# Patient Record
Sex: FEMALE | Race: WHITE
[De-identification: names, ages, dates, MRNs, and addresses within clinical notes are randomized per-mention and may not be internally consistent; named-entity substitution may affect disease eponyms.]

---

## 2020-02-03 ENCOUNTER — HOSPITAL ENCOUNTER (EMERGENCY)
Dept: HOSPITAL 74 - JER | Age: 50
Discharge: HOME | End: 2020-02-03
Payer: COMMERCIAL

## 2020-02-03 VITALS — TEMPERATURE: 97.7 F | DIASTOLIC BLOOD PRESSURE: 70 MMHG | SYSTOLIC BLOOD PRESSURE: 148 MMHG | HEART RATE: 110 BPM

## 2020-02-03 VITALS — BODY MASS INDEX: 22.4 KG/M2

## 2020-02-03 DIAGNOSIS — F17.210: ICD-10-CM

## 2020-02-03 DIAGNOSIS — N28.1: ICD-10-CM

## 2020-02-03 DIAGNOSIS — R10.9: Primary | ICD-10-CM

## 2020-02-03 LAB
ALBUMIN SERPL-MCNC: 3.2 G/DL (ref 3.4–5)
ALP SERPL-CCNC: 119 U/L (ref 45–117)
ALT SERPL-CCNC: 51 U/L (ref 13–61)
ANION GAP SERPL CALC-SCNC: 8 MMOL/L (ref 8–16)
APPEARANCE UR: CLEAR
AST SERPL-CCNC: 47 U/L (ref 15–37)
BASOPHILS # BLD: 0.3 % (ref 0–2)
BILIRUB SERPL-MCNC: 0.4 MG/DL (ref 0.2–1)
BILIRUB UR STRIP.AUTO-MCNC: NEGATIVE MG/DL
BUN SERPL-MCNC: 15.5 MG/DL (ref 7–18)
CALCIUM SERPL-MCNC: 9 MG/DL (ref 8.5–10.1)
CHLORIDE SERPL-SCNC: 110 MMOL/L (ref 98–107)
CO2 SERPL-SCNC: 23 MMOL/L (ref 21–32)
COLOR UR: YELLOW
CREAT SERPL-MCNC: 0.5 MG/DL (ref 0.55–1.3)
DEPRECATED RDW RBC AUTO: 13.2 % (ref 11.6–15.6)
EOSINOPHIL # BLD: 0.2 % (ref 0–4.5)
GLUCOSE SERPL-MCNC: 98 MG/DL (ref 74–106)
HCT VFR BLD CALC: 43.7 % (ref 32.4–45.2)
HGB BLD-MCNC: 14.6 GM/DL (ref 10.7–15.3)
KETONES UR QL STRIP: NEGATIVE
LEUKOCYTE ESTERASE UR QL STRIP.AUTO: NEGATIVE
LIPASE SERPL-CCNC: 88 U/L (ref 73–393)
LYMPHOCYTES # BLD: 35.8 % (ref 8–40)
MCH RBC QN AUTO: 27.9 PG (ref 25.7–33.7)
MCHC RBC AUTO-ENTMCNC: 33.3 G/DL (ref 32–36)
MCV RBC: 83.7 FL (ref 80–96)
MONOCYTES # BLD AUTO: 10.6 % (ref 3.8–10.2)
NEUTROPHILS # BLD: 53.1 % (ref 42.8–82.8)
NITRITE UR QL STRIP: NEGATIVE
PH UR: 5 [PH] (ref 5–8)
PLATELET # BLD AUTO: 196 K/MM3 (ref 134–434)
PMV BLD: 9.7 FL (ref 7.5–11.1)
POTASSIUM SERPLBLD-SCNC: 3.9 MMOL/L (ref 3.5–5.1)
PROT SERPL-MCNC: 6.6 G/DL (ref 6.4–8.2)
PROT UR QL STRIP: NEGATIVE
PROT UR QL STRIP: NEGATIVE
RBC # BLD AUTO: 5.22 M/MM3 (ref 3.6–5.2)
SODIUM SERPL-SCNC: 141 MMOL/L (ref 136–145)
SP GR UR: 1.02 (ref 1.01–1.03)
UROBILINOGEN UR STRIP-MCNC: 0.2 MG/DL (ref 0.2–1)
WBC # BLD AUTO: 6.7 K/MM3 (ref 4–10)

## 2020-02-03 NOTE — PDOC
History of Present Illness





- General


Chief Complaint: Pain


Stated Complaint: SENT BY PCP/ABD PAIN


Time Seen by Provider: 02/03/20 15:15


History Source: Patient


Exam Limitations: No Limitations





- History of Present Illness


Travel History: No


Initial Comments: 





02/03/20 18:14


49-year-old female sent in by Dr. Rocha for evaluation of generalized 

abdominal pain along with a cough.  Patient was sent to have an abdominal CT 

along with chest x-ray to rule out pneumonia versus colonic involvement. 

Patient denies fever, chills, headache, or diarrhea, change in urine pattern 

but states did lose approximately 10 pounds over the past 1 to 2 months.








Timing/Duration: reports: intermittent


Quality: reports: mild


Abdominal Pain Onset Location: reports: generalized abdomen


Pain Radiation: reports: no radiation


Activities at Onset: reports: none


Alleviating Factors: improves with: None





Past History





- Travel


Traveled outside of the country in the last 30 days: No


Close contact w/someone who was outside of country & ill: No





- Past Medical History


Allergies/Adverse Reactions: 


 Allergies











Allergy/AdvReac Type Severity Reaction Status Date / Time


 


No Known Allergies Allergy   Verified 02/03/20 15:18











Home Medications: 


Ambulatory Orders





NK [No Known Home Medication]  02/03/20 








COPD: No


Kidney Stones:  (POLYCYSTIC KIDNEY)





- Surgical History


Abdominal Surgery:  (ECTOPIC)





- Immunization History


Immunization Up to Date: Yes





- Psycho Social/Smoking Cessation Hx


Smoking History: Current every day smoker


Have you smoked in the past 12 months: Yes


Information on smoking cessation initiated: Yes


Hx Alcohol Use: No


Drug/Substance Use Hx: No


Patient Lives Alone: No


Lives with/in: spouse/SO





**Review of Systems





- Review of Systems


Able to Perform ROS?: Yes


Is the patient limited English proficient: No


Constitutional: Yes: Weakness, Unintentional Wgt. Loss


HEENTM: No: Symptoms Reported


Respiratory: Yes: Cough


Cardiac (ROS): No: Symptoms Reported


ABD/GI: Yes: Abdominal cramping


: No: Symptoms Reported


Musculoskeletal: No: Symptoms Reported


Integumentary: No: Symptoms Reported


Neurological: No: Symptoms reported





*Physical Exam





- Vital Signs


 Last Vital Signs











Temp Pulse Resp BP Pulse Ox


 


 97.7 F   110 H  18   148/70   99 


 


 02/03/20 15:12  02/03/20 15:12  02/03/20 15:12  02/03/20 15:12  02/03/20 15:12














- Physical Exam


General Appearance: Yes: Nourished, Appropriately Dressed.  No: Apparent 

Distress


HEENT: negative: Pale Conjunctivae


Neck: positive: Supple


Respiratory/Chest: positive: Lungs Clear, Normal Breath Sounds.  negative: 

Respiratory Distress, Accessory Muscle Use


Cardiovascular: positive: Regular Rhythm, Tachycardia.  negative: Murmur


Gastrointestinal/Abdominal: positive: Normal Bowel Sounds, Soft.  negative: 

Distended, Guarding, Rebound, Tenderness


Extremity: positive: Normal Inspection


Integumentary: positive: Normal Color, Warm, Moist


Neurologic: positive: Motor Strength 5/5 (Ambulatory)





ED Treatment Course





- LABORATORY


CBC & Chemistry Diagram: 


 02/03/20 16:35





 02/03/20 16:35





- ADDITIONAL ORDERS


Additional order review: 


 Laboratory  Results











  02/03/20 02/03/20





  16:35 16:35


 


Sodium   141


 


Potassium   3.9


 


Chloride   110 H


 


Carbon Dioxide   23


 


Anion Gap   8


 


BUN   15.5


 


Creatinine   0.5 L


 


Est GFR (CKD-EPI)AfAm   131.72


 


Est GFR (CKD-EPI)NonAf   113.65


 


Random Glucose   98


 


Calcium   9.0


 


Total Bilirubin   0.4


 


AST   47 H


 


ALT   51


 


Alkaline Phosphatase   119 H


 


Total Protein   6.6


 


Albumin   3.2 L


 


Lipase   88


 


Urine Color  Yellow 


 


Urine Appearance  Clear 


 


Urine pH  5.0 


 


Ur Specific Gravity  1.018 


 


Urine Protein  Negative 


 


Urine Glucose (UA)  Negative 


 


Urine Ketones  Negative 


 


Urine Blood  Negative 


 


Urine Nitrite  Negative 


 


Urine Bilirubin  Negative 


 


Urine Urobilinogen  0.2 


 


Ur Leukocyte Esterase  Negative 








 











  02/03/20





  16:35


 


RBC  5.22 H


 


MCV  83.7


 


MCHC  33.3


 


RDW  13.2


 


MPV  9.7


 


Neutrophils %  53.1


 


Lymphocytes %  35.8


 


Monocytes %  10.6 H


 


Eosinophils %  0.2


 


Basophils %  0.3














Medical Decision Making





- Medical Decision Making





02/03/20 17:15


 chief complaint:Sent here for evaluation of cough and abdominal pain along 

with weight loss over the past month.  Patient currently on flagyl.  Patient 

has no other complaints at this time. Patient has history of polycystic kidneys 


Exam: Patient with normal physical exam except for mildly elevated heart rate 

of 110.


Plan: Labs, urine, chest x-ray and abdominal CT ordered








02/03/20 18:24


 Laboratory Tests











  02/03/20 02/03/20 02/03/20





  16:35 16:35 16:35


 


WBC  6.7  


 


Hgb  14.6  


 


Hct  43.7  


 


Absolute Neuts (auto)  3.6  


 


Neutrophils %  53.1  


 


Monocytes %  10.6 H  


 


Eosinophils %  0.2  


 


Basophils %  0.3  


 


Nucleated RBC %  0  


 


Sodium   141 


 


Potassium   3.9 


 


Chloride   110 H 


 


Carbon Dioxide   23 


 


Anion Gap   8 


 


BUN   15.5 


 


Creatinine   0.5 L 


 


Random Glucose   98 


 


Calcium   9.0 


 


Total Bilirubin   0.4 


 


AST   47 H 


 


ALT   51 


 


Alkaline Phosphatase   119 H 


 


Total Protein   6.6 


 


Albumin   3.2 L 


 


Lipase   88 


 


Urine Glucose (UA)    Negative


 


Urine Ketones    Negative


 


Urine Blood    Negative


 


Urine Nitrite    Negative


 


Urine Bilirubin    Negative


 


Ur Leukocyte Esterase    Negative








Chest x-ray shows COPD. CT shows findings consistent with adult polycystic 

kidney disease including multiple renal and hepatic cysts.  Otherwise normal 

abdominal CT.  Case discussed with Dr. Mauricio Rocha and tried contacting him 

for update.  will discharge patient home








Discharge





- Discharge Information


Problems reviewed: Yes


Clinical Impression/Diagnosis: 


 Abdominal pain





Condition: Improved


Disposition: HOME





- Follow up/Referral


Referrals: 


Mauricio Rocha MD [Primary Care Provider] - 


Skip Gutierrez MD [Staff Physician] - 





- Patient Discharge Instructions


Patient Printed Discharge Instructions:  [facility] for Abdominal Pain-Adult


Additional Instructions: 


Please follow-up with Dr. rocha and consider following up with the 

pulmonologist as I have given you on your discharge





- Post Discharge Activity


Work/Back to School Note:  Back to Work

## 2020-02-03 NOTE — PDOC
Rapid Medical Evaluation


Time Seen by Provider: 02/03/20 15:15


Medical Evaluation: 





02/03/20 15:15


This patient had brief-in person evaluation in triage


cc:abdominal pain 





HPI:  patient sent to ed from doctor's office for chest xray and ct 


       as per patient, abdominal pain, vomiting and diarrhea.





PE:NAD


    unlabored breathing, 


    non tender abdomen





orders:chest xray and abd/pelvic ct 





This patient will proceed to main ed for further evaluation





**Discharge Disposition





- Diagnosis


 Abdominal pain








- Referrals





- Patient Instructions





- Post Discharge Activity

## 2021-08-09 ENCOUNTER — HOSPITAL ENCOUNTER (EMERGENCY)
Dept: HOSPITAL 74 - JER | Age: 51
Discharge: HOME | End: 2021-08-09
Payer: COMMERCIAL

## 2021-08-09 VITALS — DIASTOLIC BLOOD PRESSURE: 95 MMHG | SYSTOLIC BLOOD PRESSURE: 139 MMHG | HEART RATE: 104 BPM

## 2021-08-09 VITALS — BODY MASS INDEX: 25 KG/M2

## 2021-08-09 VITALS — TEMPERATURE: 97.2 F

## 2021-08-09 DIAGNOSIS — N39.0: Primary | ICD-10-CM

## 2021-08-09 LAB
ALBUMIN SERPL-MCNC: 3.1 G/DL (ref 3.4–5)
ALP SERPL-CCNC: 212 U/L (ref 45–117)
ALT SERPL-CCNC: 35 U/L (ref 13–61)
ANION GAP SERPL CALC-SCNC: 9 MMOL/L (ref 8–16)
APPEARANCE UR: CLEAR
AST SERPL-CCNC: 17 U/L (ref 15–37)
BACTERIA # UR AUTO: (no result) /UL (ref 0–1359)
BASOPHILS # BLD: 0.2 % (ref 0–2)
BILIRUB SERPL-MCNC: 0.8 MG/DL (ref 0.2–1)
BILIRUB UR STRIP.AUTO-MCNC: NEGATIVE MG/DL
BUN SERPL-MCNC: 15.6 MG/DL (ref 7–18)
CALCIUM SERPL-MCNC: 9 MG/DL (ref 8.5–10.1)
CASTS URNS QL MICRO: 5 /UL (ref 0–3.1)
CHLORIDE SERPL-SCNC: 104 MMOL/L (ref 98–107)
CO2 SERPL-SCNC: 25 MMOL/L (ref 21–32)
COLOR UR: YELLOW
CREAT SERPL-MCNC: 0.5 MG/DL (ref 0.55–1.3)
DEPRECATED RDW RBC AUTO: 14 % (ref 11.6–15.6)
EOSINOPHIL # BLD: 0 % (ref 0–4.5)
EPITH CASTS URNS QL MICRO: 24 /UL (ref 0–25.1)
GLUCOSE SERPL-MCNC: 86 MG/DL (ref 74–106)
HCT VFR BLD CALC: 35.4 % (ref 32.4–45.2)
HGB BLD-MCNC: 12 GM/DL (ref 10.7–15.3)
KETONES UR QL STRIP: NEGATIVE
LEUKOCYTE ESTERASE UR QL STRIP.AUTO: (no result)
LIPASE SERPL-CCNC: 60 U/L (ref 73–393)
LYMPHOCYTES # BLD: 16.6 % (ref 8–40)
MCH RBC QN AUTO: 27.6 PG (ref 25.7–33.7)
MCHC RBC AUTO-ENTMCNC: 34.1 G/DL (ref 32–36)
MCV RBC: 81 FL (ref 80–96)
MONOCYTES # BLD AUTO: 9.3 % (ref 3.8–10.2)
NEUTROPHILS # BLD: 73.9 % (ref 42.8–82.8)
NITRITE UR QL STRIP: POSITIVE
PH UR: 6.5 [PH] (ref 5–8)
PLATELET # BLD AUTO: 231 10^3/UL (ref 134–434)
PMV BLD: 9.4 FL (ref 7.5–11.1)
PROT SERPL-MCNC: 6.5 G/DL (ref 6.4–8.2)
PROT UR QL STRIP: (no result)
PROT UR QL STRIP: NEGATIVE
RBC # BLD AUTO: 34 /UL (ref 0–23.9)
RBC # BLD AUTO: 4.36 M/MM3 (ref 3.6–5.2)
SODIUM SERPL-SCNC: 137 MMOL/L (ref 136–145)
SP GR UR: 1.01 (ref 1.01–1.03)
UROBILINOGEN UR STRIP-MCNC: 0.2 MG/DL (ref 0.2–1)
WBC # BLD AUTO: 13.4 K/MM3 (ref 4–10)
WBC # UR AUTO: 76 /UL (ref 0–25.8)

## 2021-08-09 PROCEDURE — U0003 INFECTIOUS AGENT DETECTION BY NUCLEIC ACID (DNA OR RNA); SEVERE ACUTE RESPIRATORY SYNDROME CORONAVIRUS 2 (SARS-COV-2) (CORONAVIRUS DISEASE [COVID-19]), AMPLIFIED PROBE TECHNIQUE, MAKING USE OF HIGH THROUGHPUT TECHNOLOGIES AS DESCRIBED BY CMS-2020-01-R: HCPCS

## 2021-08-09 PROCEDURE — 3E0333Z INTRODUCTION OF ANTI-INFLAMMATORY INTO PERIPHERAL VEIN, PERCUTANEOUS APPROACH: ICD-10-PCS

## 2021-08-09 PROCEDURE — U0005 INFEC AGEN DETEC AMPLI PROBE: HCPCS

## 2021-08-09 PROCEDURE — C9803 HOPD COVID-19 SPEC COLLECT: HCPCS

## 2022-02-17 ENCOUNTER — HOSPITAL ENCOUNTER (EMERGENCY)
Dept: HOSPITAL 74 - JER | Age: 52
Discharge: HOME | End: 2022-02-17
Payer: COMMERCIAL

## 2022-02-17 VITALS — TEMPERATURE: 99.2 F | SYSTOLIC BLOOD PRESSURE: 165 MMHG | HEART RATE: 103 BPM | DIASTOLIC BLOOD PRESSURE: 78 MMHG

## 2022-02-17 VITALS — BODY MASS INDEX: 20.6 KG/M2

## 2022-02-17 DIAGNOSIS — R10.31: Primary | ICD-10-CM

## 2022-02-17 LAB
ALBUMIN SERPL-MCNC: 3.2 G/DL (ref 3.4–5)
ALP SERPL-CCNC: 175 U/L (ref 45–117)
ALT SERPL-CCNC: 32 U/L (ref 13–61)
ANION GAP SERPL CALC-SCNC: 9 MMOL/L (ref 8–16)
APPEARANCE UR: CLEAR
AST SERPL-CCNC: 22 U/L (ref 15–37)
BACTERIA # UR AUTO: 3616.5 /UL (ref 0–1359)
BASOPHILS # BLD: 0.1 % (ref 0–2)
BILIRUB SERPL-MCNC: 0.5 MG/DL (ref 0.2–1)
BILIRUB UR STRIP.AUTO-MCNC: NEGATIVE MG/DL
BUN SERPL-MCNC: 17 MG/DL (ref 7–18)
CALCIUM SERPL-MCNC: 9.5 MG/DL (ref 8.5–10.1)
CASTS URNS QL MICRO: 0.38 /UL (ref 0–3.1)
CHLORIDE SERPL-SCNC: 106 MMOL/L (ref 98–107)
CO2 SERPL-SCNC: 24 MMOL/L (ref 21–32)
COLOR UR: YELLOW
CREAT SERPL-MCNC: 0.4 MG/DL (ref 0.55–1.3)
DEPRECATED RDW RBC AUTO: 14 % (ref 11.6–15.6)
EOSINOPHIL # BLD: 0 % (ref 0–4.5)
EPITH CASTS URNS QL MICRO: 2.8 /UL (ref 0–25.1)
GLUCOSE SERPL-MCNC: 113 MG/DL (ref 74–106)
HCT VFR BLD CALC: 34.9 % (ref 32.4–45.2)
HGB BLD-MCNC: 11.4 GM/DL (ref 10.7–15.3)
INR BLD: 1.04 (ref 0.83–1.09)
KETONES UR QL STRIP: NEGATIVE
LEUKOCYTE ESTERASE UR QL STRIP.AUTO: (no result)
LIPASE SERPL-CCNC: 234 U/L (ref 73–393)
LYMPHOCYTES # BLD: 7.8 % (ref 8–40)
MCH RBC QN AUTO: 26.2 PG (ref 25.7–33.7)
MCHC RBC AUTO-ENTMCNC: 32.5 G/DL (ref 32–36)
MCV RBC: 80.4 FL (ref 80–96)
MONOCYTES # BLD AUTO: 10.6 % (ref 3.8–10.2)
NEUTROPHILS # BLD: 81.5 % (ref 42.8–82.8)
NITRITE UR QL STRIP: NEGATIVE
PH UR: 5 [PH] (ref 5–8)
PLATELET # BLD AUTO: 252 10^3/UL (ref 134–434)
PMV BLD: 9.9 FL (ref 7.5–11.1)
PROT SERPL-MCNC: 6.7 G/DL (ref 6.4–8.2)
PROT UR QL STRIP: NEGATIVE
PROT UR QL STRIP: NEGATIVE
PT PNL PPP: 12 SEC (ref 9.7–13)
RBC # BLD AUTO: 12 /UL (ref 0–23.9)
RBC # BLD AUTO: 4.34 M/MM3 (ref 3.6–5.2)
SODIUM SERPL-SCNC: 139 MMOL/L (ref 136–145)
SP GR UR: 1.05 (ref 1.01–1.03)
UROBILINOGEN UR STRIP-MCNC: 0.2 MG/DL (ref 0.2–1)
WBC # BLD AUTO: 12.5 K/MM3 (ref 4–10)
WBC # UR AUTO: 207.8 /UL (ref 0–25.8)

## 2022-02-17 PROCEDURE — 3E033GC INTRODUCTION OF OTHER THERAPEUTIC SUBSTANCE INTO PERIPHERAL VEIN, PERCUTANEOUS APPROACH: ICD-10-PCS
